# Patient Record
Sex: MALE | Race: WHITE | NOT HISPANIC OR LATINO | Employment: FULL TIME | URBAN - METROPOLITAN AREA
[De-identification: names, ages, dates, MRNs, and addresses within clinical notes are randomized per-mention and may not be internally consistent; named-entity substitution may affect disease eponyms.]

---

## 2023-04-20 ENCOUNTER — HOSPITAL ENCOUNTER (EMERGENCY)
Facility: MEDICAL CENTER | Age: 33
End: 2023-04-20
Attending: EMERGENCY MEDICINE
Payer: COMMERCIAL

## 2023-04-20 ENCOUNTER — APPOINTMENT (OUTPATIENT)
Dept: RADIOLOGY | Facility: MEDICAL CENTER | Age: 33
End: 2023-04-20
Attending: EMERGENCY MEDICINE
Payer: COMMERCIAL

## 2023-04-20 VITALS
SYSTOLIC BLOOD PRESSURE: 126 MMHG | BODY MASS INDEX: 27.92 KG/M2 | OXYGEN SATURATION: 99 % | TEMPERATURE: 97.6 F | DIASTOLIC BLOOD PRESSURE: 80 MMHG | HEIGHT: 67 IN | HEART RATE: 86 BPM | WEIGHT: 177.91 LBS | RESPIRATION RATE: 18 BRPM

## 2023-04-20 DIAGNOSIS — S62.357A CLOSED NONDISPLACED FRACTURE OF SHAFT OF FIFTH METACARPAL BONE OF LEFT HAND, INITIAL ENCOUNTER: ICD-10-CM

## 2023-04-20 PROCEDURE — 302874 HCHG BANDAGE ACE 2 OR 3""

## 2023-04-20 PROCEDURE — 73130 X-RAY EXAM OF HAND: CPT | Mod: LT

## 2023-04-20 PROCEDURE — 99284 EMERGENCY DEPT VISIT MOD MDM: CPT

## 2023-04-20 PROCEDURE — 29125 APPL SHORT ARM SPLINT STATIC: CPT

## 2023-04-20 NOTE — Clinical Note
Georgi Hutchison was seen and treated in our emergency department on 4/20/2023.  He may return to work on 04/24/2023.  No use of the left hand until cleared by orthopedics.     If you have any questions or concerns, please don't hesitate to call.      Ashley Downey M.D.

## 2023-04-20 NOTE — ED TRIAGE NOTES
Georgi Hutchison  33 y.o.  male  Chief Complaint   Patient presents with    Hand Injury     Pt was at work, slipped on towels on the floor & caught himself by grabbing a chair with his L hand. No c/o L hand pain, swelling noted to knuckles. Denies wrist pain. CSM intact.        Ice pack in place. Pt's friend translating for him from Greek per patient request. Patient educated on triage process, to alert staff if any changes in condition.

## 2023-04-20 NOTE — ED PROVIDER NOTES
"  ER Provider Note    Scribed for Ashley Downey M.d. by Shayna Young. 4/20/2023  4:57 PM    Primary Care Provider: No primary care provider noted.    CHIEF COMPLAINT  Chief Complaint   Patient presents with    Hand Injury     Pt was at work, slipped on towels on the floor & caught himself by grabbing a chair with his L hand. No c/o L hand pain, swelling noted to knuckles. Denies wrist pain. CSM intact.      LIMITATION TO HISTORY   Select: Language: Kiswahili, Son intepreted    HPI/ROS  OUTSIDE HISTORIAN(S):  Family Patient's son provided patient history.    Georgi Hutchison is a 33 y.o. male who presents to the ED for a left hand injury onset today. The patient's son states that the patient was at work, slipped on towels, and caught himself with his left hand. He reports he is right handed and denies hitting his head during the incident. He also experiences left hand swelling and diffuse pain. Patient denies other pain or injury. No alleviating or exacerbating factors reported.    PAST MEDICAL HISTORY  No past medical history noted.    SURGICAL HISTORY  No past surgical history noted.    FAMILY HISTORY  No family history noted.    SOCIAL HISTORY  None pertinent    CURRENT MEDICATIONS  No current outpatient medications    ALLERGIES  Patient has no known allergies.    PHYSICAL EXAM  /76   Pulse 85   Temp 36.3 °C (97.3 °F) (Temporal)   Resp 16   Ht 1.702 m (5' 7\")   Wt 80.7 kg (177 lb 14.6 oz)   SpO2 97%   BMI 27.86 kg/m²     Constitutional: Alert in no apparent distress.  HENT: Normocephalic, Bilateral external ears normal. Nose normal.   Eyes: Pupils are equal and reactive. Conjunctiva normal, non-icteric.   Thorax & Lungs: Easy unlabored respirations  Abdomen:  No gross signs of peritonitis, no pain with movement   Skin: Visualized skin is  Dry, No erythema, No rash. No open wounds.   Extremities:  Left hand with swelling to the dorsum and tenderness over the 2nd and 3d metacarpal and 5th metacarpal, Pain " with ROM, Intact sensation, Good cap refill, Able to move fingers  Neurologic: Alert, Grossly non-focal.   Psychiatric: Affect and Mood normal    DIAGNOSTIC STUDIES & PROCEDURES    Radiology:   The attending Emergency Physician has independently interpreted the diagnostic imaging associated with this visit and is awaiting the final reading from the radiologist, which will be displayed below.    Preliminary interpretation is a follows: soft tissue swelling  Radiologist interpretation:   DX-HAND 3+ LEFT   Final Result         Lucencies seen in the fifth metacarpal could be nondisplaced fracture or trabeculation. Correlate with point tenderness         COURSE & MEDICAL DECISION MAKING    ED Observation Status? No; Patient does not meet criteria for ED Observation.     INITIAL ASSESSMENT AND PLAN  Care Narrative: Patient presents to the ER with left hand pain after trauma.  Neurovascularly intact.  Will x-ray to evaluate for possible fracture versus strain versus contusion.      4:57 PM - Patient seen and evaluated at bedside. Ordered DX-Hand Left to evaluate. Discussed plan of care with patient and son, including imaging of his left hand to rule out fracture. They understand and agree to the plan of care. Differential diagnoses include but are not limited to: Fracture vs Contusion    5:57 PM - Patient was reevaluated at bedside. Discussed radiology results with the patient and informed them that there is possibly a fracture. Informed patient that he will be placed in a splint and referred to ortho for recheck and possible cast placement. Patient had the opportunity to ask any questions. The plan for discharge was discussed with them and they were told to return for any new or worsening symptoms. He was also informed of the plans for follow up. Patient is understanding and agreeable to the plan for discharge.                  DISPOSITION AND DISCUSSIONS  I have discussed management of the patient with the following  physicians and ROSALBA's: None    Discussion of management with other \[facility]\"" or appropriate source(s): None     Escalation of care considered, and ultimately not performed: diagnostic imaging like ct to confirm fracture.    Barriers to care at this time, including but not limited to: Patient does not have established PCP.     Decision tools and prescription drugs considered including, but not limited to: Pain Medications   .    The patient will return for new or worsening symptoms and is stable at the time of discharge.    The patient is referred to a primary physician for blood pressure management, diabetic screening, and for all other preventative health concerns.    DISPOSITION:  Patient will be discharged home in stable condition.    FOLLOW UP:  Merrick Angelo M.D.  [address] [facility] [address]  [phone]    Call   If symptoms worsen, return to the er.    FINAL IMPRESSION   1. Closed nondisplaced fracture of shaft of fifth metacarpal bone of left hand, initial encounter      IShayna (Mc), am scribing for, and in the presence of, Ashley Downey M.D..    Electronically signed by: Shayna Young (Mc), 4/20/2023    IAshley M.D. personally performed the services described in this documentation, as scribed by Shayna Young in my presence, and it is both accurate and complete.    The note accurately reflects work and decisions made by me.  Ashley Downey M.D.  4/20/2023  8:54 PM

## 2023-04-20 NOTE — LETTER
"  FORM C-4:  EMPLOYEE’S CLAIM FOR COMPENSATION/ REPORT OF INITIAL TREATMENT  EMPLOYEE’S CLAIM - PROVIDE ALL INFORMATION REQUESTED   First Name Georgi Last Name Foster Birthdate 1990  Sex male Claim Number   Home Address [address]             [place] [address]                                   Age  33 y.o. Height  1.702 m (5' 7\") Weight  80.7 kg (177 lb 14.6 oz) SSN     Mailing Address [address]              Zip [address] Telephone  [phone] (home)  Primary Language Spoken  Sammarinese   Insurer   Third Party   ALESHIA CLAIMS [id number] Employee's Occupation (Job Title) When Injury or Occupational Disease Occurred  [employer]   Employer's Name [employer] Telephone [phone]    Employer Address [address] [facility] [address]] Zip [address]   Date of Injury  4/20/2023       Hour of Injury  7:30 AM Date Employer Notified  4/20/2023 Last Day of Work after Injury or Occupational Disease  4/20/2023 Supervisor to Whom Injury Reported  Atan   Address or Location of Accident (if applicable) Work [1]   What were you doing at the time of accident? (if applicable) Changed Clothes    How did this injury or occupational disease occur? Be specific and answer in detail. Use additional sheet if necessary)  Slipped on towels   If you believe that you have an occupational disease, when did you first have knowledge of the disability and it relationship to your employment? N/A Witnesses to the Accident  Puma Heller   Nature of Injury or Occupational Disease  Workers' Compensation Part(s) of Body Injured or Affected  Hand (L), N/A, N/A    I CERTIFY THAT THE ABOVE IS TRUE AND CORRECT TO THE BEST OF MY KNOWLEDGE AND THAT I HAVE PROVIDED THIS INFORMATION IN ORDER TO OBTAIN THE BENEFITS OF [place] OR [place]).  I HEREBY AUTHORIZE ANY PHYSICIAN, CHIROPRACTOR, SURGEON, " PRACTITIONER, OR OTHER PERSON, ANY HOSPITAL, INCLUDING [facility] OR [facility], ANY MEDICAL SERVICE ORGANIZATION, ANY INSURANCE COMPANY, OR OTHER INSTITUTION OR ORGANIZATION TO RELEASE TO EACH OTHER, ANY MEDICAL OR OTHER INFORMATION, INCLUDING BENEFITS PAID OR PAYABLE, PERTINENT TO THIS INJURY OR DISEASE, EXCEPT INFORMATION RELATIVE TO DIAGNOSIS, TREATMENT AND/OR COUNSELING FOR AIDS, PSYCHOLOGICAL CONDITIONS, ALCOHOL OR CONTROLLED SUBSTANCES, FOR WHICH I MUST GIVE SPECIFIC AUTHORIZATION.  A PHOTOSTAT OF THIS AUTHORIZATION SHALL BE AS VALID AS THE ORIGINAL.  Date   04/20/23  Place   [facility]    Employee’s Signature   THIS REPORT MUST BE COMPLETED AND MAILED WITHIN 3 WORKING DAYS OF TREATMENT   Place [facility], EMERGENCY DEPT                       Name of Facility [facility]   Date  4/20/2023 Diagnosis  (S62.357A) Closed nondisplaced fracture of shaft of fifth metacarpal bone of left hand, initial encounter Is there evidence the injured employee was under the influence of alcohol and/or another controlled substance at the time of accident?   Hour  6:11 PM Description of Injury or Disease  Closed nondisplaced fracture of shaft of fifth metacarpal bone of left hand, initial encounter No   Treatment  Xray and splint  Have you advised the patient to remain off work five days or more?         No   X-Ray Findings  Positive If Yes   From Date    To Date      From information given by the employee, together with medical evidence, can you directly connect this injury or occupational disease as job incurred? Yes If No, is employee capable of: Full Duty  No Modified Duty  Yes   Is additional medical care by a physician indicated? Yes If Modified Duty, Specify any Limitations / Restrictions   Limited use of left hand until cleared by orthopedics   Do you know of any previous injury or disease contributing to this condition or occupational disease? No    Date 4/20/2023  "Print Doctor’s Name Ashley Downey I certify the employer’s copy of this form was mailed on:   Address [address] [place] [address] [phone] INSURER’S USE ONLY   Provider’s Tax ID Number [id number] Telephone Dept: [phone]    Doctor’s Signature ASHLEY Whiting M.D. Degree M.OKSANA.      Form C-4 (rev.10/07)                                                                         BRIEF DESCRIPTION OF RIGHTS AND BENEFITS  (Pursuant to [place] 616C.050)    Notice of Injury or Occupational Disease (Incident Report Form C-1): If an injury or occupational disease (OD) arises out of and in the course of employment, you must provide written notice to your employer as soon as practicable, but no later than 7 days after the accident or OD. Your employer shall maintain a sufficient supply of the required forms.    Claim for Compensation (Form C-4): If medical treatment is sought, the form C-4 is available at the place of initial treatment. A completed \"Claim for Compensation\" (Form C-4) must be filed within 90 days after an accident or OD. The treating physician or chiropractor must, within 3 working days after treatment, complete and mail to the employer, the employer's insurer and third-party , the Claim for Compensation.    Medical Treatment: If you require medical treatment for your on-the-job injury or OD, you may be required to select a physician or chiropractor from a list provided by your workers’ compensation insurer, if it has contracted with an Organization for Managed Care (MCO) or Preferred Provider Organization (PPO) or providers of health care. If your employer has not entered into a contract with an MCO or PPO, you may select a physician or chiropractor from the Panel of Physicians and Chiropractors. Any medical costs related to your industrial injury or OD will be paid by your insurer.    Temporary Total Disability (TTD): If your doctor has certified that you are unable to work for a period " of at least 5 consecutive days, or 5 cumulative days in a 20-day period, or places restrictions on you that your employer does not accommodate, you may be entitled to TTD compensation.    Temporary Partial Disability (TPD): If the wage you receive upon reemployment is less than the compensation for TTD to which you are entitled, the insurer may be required to pay you TPD compensation to make up the difference. TPD can only be paid for a maximum of 24 months.    Permanent Partial Disability (PPD): When your medical condition is stable and there is an indication of a PPD as a result of your injury or OD, within 30 days, your insurer must arrange for an evaluation by a rating physician or chiropractor to determine the degree of your PPD. The amount of your PPD award depends on the date of injury, the results of the PPD evaluation, your age and wage.    Permanent Total Disability (PTD): If you are medically certified by a treating physician or chiropractor as permanently and totally disabled and have been granted a PTD status by your insurer, you are entitled to receive monthly benefits not to exceed 66 2/3% of your average monthly wage. The amount of your PTD payments is subject to reduction if you previously received a lump-sum PPD award.    Vocational Rehabilitation Services: You may be eligible for vocational rehabilitation services if you are unable to return to the job due to a permanent physical impairment or permanent restrictions as a result of your injury or occupational disease.    Transportation and Per Yudi Reimbursement: You may be eligible for travel expenses and per yudi associated with medical treatment.    Reopening: You may be able to reopen your claim if your condition worsens after claim closure.     Appeal Process: If you disagree with a written determination issued by the insurer or the insurer does not respond to your request, you may appeal to the Department of Administration, ,  by following the instructions contained in your determination letter. You must appeal the determination within 70 days from the date of the determination letter at [address], [address], [place], [place] [address], or [address], [address], [place], [place] [address]. If you disagree with the  decision, you may appeal to the Department of Administration, . You must file your appeal within 30 days from the date of the  decision letter at [address], [address], [place], [place] [address], or [address], [address], [place], [place] [address]. If you disagree with a decision of an , you may file a petition for judicial review with the District Court. You must do so within 30 days of the Appeal Officer’s decision. You may be represented by an  at your own expense or you may contact the [facility] for possible representation.    [place]  for Injured Workers (NAIW): If you disagree with a  decision, you may request that NAIW represent you without charge at an  Hearing. For information regarding denial of benefits, you may contact the [facility] at: [address], [address], [place] [address], [phone], or [address], [address], [place] [address], [phone]    To File a Complaint with the Division: If you wish to file a complaint with the  of the Division of Industrial Relations (DIR),  please contact the Workers’ Compensation Section, [address], [address], [place], [place] [address], telephone (613) [phone], or [address], [address], [place], [place] [address], telephone ([phone]) [phone].    For assistance with Workers’ Compensation Issues: You may contact the [facility] Office for Consumer Health Assistance, [address], [address], [place], [place] [address], Toll Free 1-167.249.9295, Web site:  http://[facility].[place].gov/Tenisha/DAYTON E-mail: dayton@[facility].[place].gov  D-2 (rev. 10/20)              __________________________________________________________________                                    _________04/20/2023________            Employee Name / Signature                                                                                                                            Date

## 2023-04-21 NOTE — ED NOTES
Pt was educated on discharge and where to come back if their symptoms worsen and how to access their chart from [facility]. Pt left with a GCS of 15 and ambulated to the lobby with no assistance and all belongings in hand.

## 2023-04-21 NOTE — DISCHARGE INSTRUCTIONS
Your x-ray suggest you have a broken bone in your hand.  The splint stays on at all time.  Follow-up with the specialist to obtain a cast.  Ice and elevate.  Do not get your splint wet.  Any numbness coolness or intolerable pain return.  Take Tylenol for pain.

## 2023-04-24 ENCOUNTER — OCCUPATIONAL MEDICINE (OUTPATIENT)
Dept: URGENT CARE | Facility: CLINIC | Age: 33
End: 2023-04-24
Payer: COMMERCIAL

## 2023-04-24 VITALS
WEIGHT: 177 LBS | HEIGHT: 67 IN | RESPIRATION RATE: 14 BRPM | SYSTOLIC BLOOD PRESSURE: 118 MMHG | BODY MASS INDEX: 27.78 KG/M2 | OXYGEN SATURATION: 98 % | TEMPERATURE: 97.8 F | DIASTOLIC BLOOD PRESSURE: 82 MMHG | HEART RATE: 94 BPM

## 2023-04-24 DIAGNOSIS — S62.92XD CLOSED FRACTURE OF LEFT HAND WITH ROUTINE HEALING, SUBSEQUENT ENCOUNTER: ICD-10-CM
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
## 2023-04-24 PROCEDURE — 99203 OFFICE O/P NEW LOW 30 MIN: CPT | Performed by: NURSE PRACTITIONER

## 2023-04-24 ASSESSMENT — ENCOUNTER SYMPTOMS
NEUROLOGICAL NEGATIVE: 1
CONSTITUTIONAL NEGATIVE: 1
WEAKNESS: 0
SENSORY CHANGE: 0

## 2023-04-24 ASSESSMENT — VISUAL ACUITY: OU: 1

## 2023-04-24 NOTE — DISCHARGE PLANNING
Note:  Received call from [employer]ory that pt is here requesting an extension of his work note. Pt already received a note prior to discharge. Per chart review, pt was injured at work, on workers comp. Informed that pt will need to follow up with Occupational Health. Provided address and contact information for [facility].

## 2023-04-24 NOTE — LETTER
"   [facility] [place] [facility]  [address] [place] [address]  Phone:  [phone] - Fax:  [phone]   [facility] Progress Report and Disability Certification  Date of Service: 4/24/2023   No Show:  No  Date / Time of Next Visit: 5/1/2023 @ 8:00 AM- [facility]    Claim Information   Patient Name: Georgi Hutchison  Claim Number:     Employer: [employer]  Date of Injury: 4/20/2023     Insurer / TPA: [employer]  ID / SSN:     Occupation: [employer]  Diagnosis: Diagnoses of Closed fracture of left hand with routine healing, subsequent encounter and Fall, subsequent encounter were pertinent to this visit.    Medical Information   Related to Industrial Injury? Yes    Subjective Complaints:  Initial ER visit 4/20/2023 reviewed for continuity of care:    \"Georgi Hutchison is a 33 y.o. male who presents to the ED for a left hand injury onset today. The patient's son states that the patient was at work, slipped on towels, and caught himself with his left hand. He reports he is right handed and denies hitting his head during the incident. He also experiences left hand swelling and diffuse pain. Patient denies other pain or injury. No alleviating or exacerbating factors reported.\"    XR of left hand concerning for possible 5th metacarpal fracture. Placed in splint and referred to ortho.    Follow-up UC visit today 4/24/2023:    DOI 4/20/2023 7:30 AM. Mohawk translation provided by professional video conferencing service. Patient continues to report pain/tenderness/swelling at the dorsal aspect of his lateral left hand and wrist. Has decreased ROM due to pain, otherwise CMS intact. Patient has kept patient out of work. Has appointment with ortho May 3. No other/new symptoms.   Objective Findings: Constitutional:       General: He is not in acute distress.     Appearance: He is well-developed. He is not ill-appearing or toxic-appearing.   Musculoskeletal:         General: No " deformity.      Left wrist: Tenderness (Ulnar) present. No swelling, deformity or lacerations. Decreased range of motion.      Left hand: Swelling (Over left 5th metacarpal) and tenderness (Over left 5th metacarpal) present. No deformity. Normal strength. Normal sensation. Normal capillary refill.   Skin:     General: Skin is warm and dry.      Coloration: Skin is not pale.   Neurological:      Mental Status: He is alert and oriented to person, place, and time.      Motor: No weakness.    Pre-Existing Condition(s):     Assessment:   Condition Same    Status: Discharged / Care Transfer  Permanent Disability:No    Plan:      Diagnostics:      Comments:  Splint reapplied with new materials. Patient has ortho appointment May 3. Follow up with occupational medicine within 1 week. Work restrictions as indicated. Rest, ice, compression, and elevation (RICE) and over-the-counter acetaminophen alternating with ibuprofen, per 's instructions, as needed for pain. Seek immediate medical attention if symptoms change/worsen.     Disability Information   Status: Released to Restricted Duty    From:  4/24/2023  Through: 5/1/2023 Restrictions are: Temporary   Physical Restrictions   Sitting:    Standing:    Stooping:    Bending:      Squatting:    Walking:    Climbing:    Pushing:      Pulling:    Other:    Reaching Above Shoulder (L):   Reaching Above Shoulder (R):       Reaching Below Shoulder (L):    Reaching Below Shoulder (R):      Not to exceed Weight Limits   Carrying(hrs):   Weight Limit(lb):   Lifting(hrs):   Weight  Limit(lb):     Comments: No use of left upper extremity; must keep splint on at all times    Repetitive Actions   Hands: i.e. Fine Manipulations from Grasping:     Feet: i.e. Operating Foot Controls:     Driving / Operate Machinery:     Health Care Provider’s Original or Electronic Signature  HUONG Rose. Health Care Provider’s Original or Electronic Signature    Rafael Robins DO  [facility]     Clinic Name / Location: [address]  [place] [address] Clinic Phone Number: Dept: [phone]   Appointment Time: 4:45 Pm Visit Start Time: 5:04 PM   Check-In Time:  4:45 Pm Visit Discharge Time:  5:36 PM    Original-Treating Physician or Chiropractor    Page 2-Insurer/TPA    Page 3-Employer    Page 4-Employee

## 2023-04-25 NOTE — PROGRESS NOTES
"Subjective:     Georgi Hutchison is a 33 y.o. male who presents for Work-Related Injury ( [facility] DOI: 04-23-23 L ARM INJURY)    Initial ER visit 4/20/2023 reviewed for continuity of care:    \"Georgi Hutchison is a 33 y.o. male who presents to the ED for a left hand injury onset today. The patient's son states that the patient was at work, slipped on towels, and caught himself with his left hand. He reports he is right handed and denies hitting his head during the incident. He also experiences left hand swelling and diffuse pain. Patient denies other pain or injury. No alleviating or exacerbating factors reported.\"    XR of left hand concerning for possible 5th metacarpal fracture. Placed in splint and referred to ortho.    Follow-up UC visit today 4/24/2023:    DOI 4/20/2023 7:30 AM. Brazilian translation provided by professional video conferencing service. Patient continues to report pain/tenderness/swelling at the dorsal aspect of his lateral left hand and wrist. Has decreased ROM due to pain, otherwise CMS intact. Patient has kept patient out of work. Has appointment with ortho May 3. No other/new symptoms.    Review of Systems   Constitutional: Negative.    Musculoskeletal:         Per HPI   Neurological: Negative.  Negative for sensory change and weakness.   All other systems reviewed and are negative.    Refer to HPI for additional details.    During this visit, appropriate PPE was worn, hand hygiene was performed, and the patient and any visitors were masked.    PMH: No pertinent past medical history to this problem  MEDS: Medications were reviewed in Epic  ALLERGIES: Allergies were reviewed in Epic  SOCHX: Works as a cook   FH: No pertinent family history to this problem      Objective:     /82   Pulse 94   Temp 36.6 °C (97.8 °F) (Temporal)   Resp 14   Ht 1.702 m (5' 7\")   Wt 80.3 kg (177 lb)   SpO2 98%   BMI 27.72 kg/m²     Physical Exam  Nursing note reviewed.   Constitutional:       General: He is not in " acute distress.     Appearance: He is well-developed. He is not ill-appearing or toxic-appearing.   Eyes:      General: Vision grossly intact.   Cardiovascular:      Rate and Rhythm: Normal rate.   Pulmonary:      Effort: Pulmonary effort is normal. No respiratory distress.   Musculoskeletal:         General: No deformity.      Left wrist: Tenderness (Ulnar) present. No swelling, deformity or lacerations. Decreased range of motion.      Left hand: Swelling (Over left 5th metacarpal) and tenderness (Over left 5th metacarpal) present. No deformity. Normal strength. Normal sensation. Normal capillary refill.   Skin:     General: Skin is warm and dry.      Coloration: Skin is not pale.   Neurological:      Mental Status: He is alert and oriented to person, place, and time.      Motor: No weakness.   Psychiatric:         Behavior: Behavior normal. Behavior is cooperative.       Assessment/Plan:     1. Closed fracture of left hand with routine healing, subsequent encounter  - Referral to Occupational Medicine  - Referral to Orthopedics    2. Fall, subsequent encounter  - Referral to Occupational Medicine  - Referral to Orthopedics    Splint reapplied with new materials. Patient has ortho appointment May 3. Follow up with occupational medicine within 1 week. Work restrictions as indicated. Rest, ice, compression, and elevation (RICE) and over-the-counter acetaminophen alternating with ibuprofen, per 's instructions, as needed for pain. Seek immediate medical attention if symptoms change/worsen.     Differential diagnosis, natural history, supportive care, over-the-counter symptom management per 's instructions, close monitoring, and indications for immediate follow-up discussed.     All questions answered. Patient agrees with the plan of care.

## 2023-05-01 ENCOUNTER — OCCUPATIONAL MEDICINE (OUTPATIENT)
Dept: OCCUPATIONAL MEDICINE | Facility: CLINIC | Age: 33
End: 2023-05-01
Payer: COMMERCIAL

## 2023-05-01 VITALS
HEART RATE: 64 BPM | HEIGHT: 72 IN | RESPIRATION RATE: 16 BRPM | WEIGHT: 177 LBS | TEMPERATURE: 97.6 F | BODY MASS INDEX: 23.98 KG/M2 | SYSTOLIC BLOOD PRESSURE: 132 MMHG | DIASTOLIC BLOOD PRESSURE: 74 MMHG | OXYGEN SATURATION: 97 %

## 2023-05-01 DIAGNOSIS — S62.92XD CLOSED FRACTURE OF LEFT HAND WITH ROUTINE HEALING: ICD-10-CM

## 2023-05-01 PROCEDURE — 99213 OFFICE O/P EST LOW 20 MIN: CPT | Performed by: NURSE PRACTITIONER

## 2023-05-01 NOTE — PROGRESS NOTES
Subjective:     Georgi Hutchison is a 33 y.o. male who presents for Other (New wc doi: 04/20/2023 left hand feeling worse rm 16)      DOI 4/20/23The patient's son states that the patient was at work, slipped on towels, and caught himself with his left hand.  Patient interpreted use at this visit.  Today states symptoms have remained unchanged.  He has minimal pain and swelling when he takes a [place] pain reliever.  He has no numbness or tingling.  He is using ice and elevating.  He has a Ortho-Glass splint in place in which he is keeping clean, dry, and intact.  He has been turned to work yesterday and is following the light duty restrictions with minimal difficulty.  He is scheduled to see orthopedist on 5/4/2023.  Plan of care discussed with patient.    ROS: All systems were reviewed on intake form, form was reviewed and signed. See scanned documents in media. Pertinent positives and negatives included in HPI.    PMH: No pertinent past medical history to this problem  MEDS: Medications were reviewed in Epic  ALLERGIES: No Known Allergies  SOCHX: Works as Cook at [employer]  FH: No pertinent family history to this problem       Objective:     /74 (BP Location: Right arm, Patient Position: Sitting, BP Cuff Size: Adult long)   Pulse 64   Temp 36.4 °C (97.6 °F) (Temporal)   Resp 16   Ht 1.829 m (6')   Wt 80.3 kg (177 lb)   SpO2 97%   BMI 24.01 kg/m²     [unfilled]    Left upper extremity: No gross deformity or discoloration noted.  Ortho-Glass splint in place new dressing applied.  Able to wiggle fingers with minimal difficulty.  Distal neurovascular sensation intact.  Soft tissue swelling to the dorsum and tenderness over the 2nd and 3d metacarpal and 5th metacarpal    Assessment/Plan:       1. Closed fracture of left hand with routine healing    Released to Restricted Duty FROM 5/1/2023 TO    No use of left upper extremity until cleared by orthopedics  Follow-up with orthopedics 5/4/2023  Restricted duty, per  orthopedics  Orthopedic referral approved, transfer care  Recommend continue with pain reliever of your choosing, ice, elevation,  Recommend keep Ortho-Glass splint in place clean, dry, and intact  Strict ED precautions discussed with patient.    Differential diagnosis, natural history, supportive care, and indications for immediate follow-up discussed.    Approximately 25 minutes were spent in reviewing notes, preparing for visit, obtaining history, exam and evaluation, patient counseling/education and post visit documentation/orders.

## 2023-05-01 NOTE — LETTER
[address],   [address] [place] [address]  Phone:  [phone] - Fax:  [phone]   [facility] Progress Report and Disability Certification  Date of Service: 5/1/2023   No Show:  No  Date / Time of Next Visit:  Discharge/care transfer to orthopedics 5/4/2023   Claim Information   Patient Name: Georgi Hutchison  Claim Number:     Employer: [employer]  Date of Injury: 4/20/2023     Insurer / TPA: [employer]  ID / SSN:     Occupation: [employer] Diagnosis: The encounter diagnosis was Closed fracture of left hand with routine healing.    Medical Information   Related to Industrial Injury? Yes    Subjective Complaints:  DOI 4/20/23The patient's son states that the patient was at work, slipped on towels, and caught himself with his left hand.  Patient interpreted use at this visit.  Today states symptoms have remained unchanged.  He has minimal pain and swelling when he takes a Brazilian pain reliever.  He has no numbness or tingling.  He is using ice and elevating.  He has a Ortho-Glass splint in place in which he is keeping clean, dry, and intact.  He has been turned to work yesterday and is following the light duty restrictions with minimal difficulty.  He is scheduled to see orthopedist on 5/4/2023.  Plan of care discussed with patient.   Objective Findings: Left upper extremity: No gross deformity or discoloration noted.  Ortho-Glass splint in place new dressing applied.  Able to wiggle fingers with minimal difficulty.  Distal neurovascular sensation intact.  Soft tissue swelling to the dorsum and tenderness over the 2nd and 3d metacarpal and 5th metacarpal   Pre-Existing Condition(s):     Assessment:   Condition Same    Status: Discharged / Care Transfer  Permanent Disability:No    Plan: Transfer Care    Diagnostics:      Comments:  Follow-up with orthopedics 5/4/2023  Restricted duty, per orthopedics  Orthopedic referral approved,  transfer care  Recommend continue with pain reliever of your choosing, ice, elevation,  Recommend keep Ortho-Glass splint in place clean, dry, and intact  Strict ED precautions discussed with patient.    Disability Information   Status: Released to Restricted Duty    From:  2023  Through:   Restrictions are: Temporary   Physical Restrictions   Sitting:    Standing:    Stooping:    Bending:      Squatting:    Walking:    Climbin hrs/day Pushin hrs/day   Pullin hrs/day Other:    Reaching Above Shoulder (L):   Reaching Above Shoulder (R):       Reaching Below Shoulder (L):    Reaching Below Shoulder (R):      Not to exceed Weight Limits   Carrying(hrs): 0 Weight Limit(lb):   Lifting(hrs): 0 Weight  Limit(lb):     Comments: No use of left upper extremity until cleared by orthopedics    Repetitive Actions   Hands: i.e. Fine Manipulations from Grasping:     Feet: i.e. Operating Foot Controls:     Driving / Operate Machinery:     Health Care Provider’s Original or Electronic Signature  AUSTIN Fajardo Health Care Provider’s Original or Electronic Signature    Rafael Robins DO MPH     Clinic Name / Location: [address],   [address]  [place] [address] Clinic Phone Number: Dept: [phone]   Appointment Time: 8:00 Am Visit Start Time: 8:44 AM   Check-In Time:  8:37 Am Visit Discharge Time:  9:08AM   Original-Treating Physician or Chiropractor    Page 2-Insurer/TPA    Page 3-Employer    Page 4-Employee